# Patient Record
Sex: MALE | Race: WHITE | Employment: FULL TIME | URBAN - METROPOLITAN AREA
[De-identification: names, ages, dates, MRNs, and addresses within clinical notes are randomized per-mention and may not be internally consistent; named-entity substitution may affect disease eponyms.]

---

## 2019-01-02 ENCOUNTER — OFFICE VISIT (OUTPATIENT)
Dept: NEUROLOGY | Age: 44
End: 2019-01-02

## 2019-01-02 VITALS
SYSTOLIC BLOOD PRESSURE: 128 MMHG | BODY MASS INDEX: 35.99 KG/M2 | HEIGHT: 65 IN | WEIGHT: 216 LBS | OXYGEN SATURATION: 98 % | DIASTOLIC BLOOD PRESSURE: 78 MMHG | HEART RATE: 66 BPM | RESPIRATION RATE: 16 BRPM

## 2019-01-02 DIAGNOSIS — I77.74 VERTEBRAL ARTERY DISSECTION (HCC): Primary | ICD-10-CM

## 2019-01-02 DIAGNOSIS — I63.212 CEREBROVASCULAR ACCIDENT (CVA) DUE TO OCCLUSION OF LEFT VERTEBRAL ARTERY (HCC): ICD-10-CM

## 2019-01-02 DIAGNOSIS — Z00.00 PREVENTATIVE HEALTH CARE: ICD-10-CM

## 2019-01-02 PROBLEM — E66.01 SEVERE OBESITY (HCC): Status: ACTIVE | Noted: 2019-01-02

## 2019-01-02 RX ORDER — ASPIRIN 81 MG/1
81 TABLET ORAL DAILY
Qty: 128 TAB | Refills: 0 | Status: SHIPPED | COMMUNITY
Start: 2019-01-02

## 2019-01-02 NOTE — PATIENT INSTRUCTIONS
Start taking aspirin 81 mg daily Have the arteries in your head and neck evaluated with tests ordered I put in a referral to [facility] for you to establish primary care Follow-up after testing has been completed

## 2019-01-02 NOTE — PROGRESS NOTES
Chief Complaint Patient presents with  Numbness  
  bilateral hands  Dizziness Stroke in 2016  Gait Problem  
  poor balance Visit Vitals /78 Pulse 66 Resp 16 Ht 5' 5\" (1.651 m) Wt 98 kg (216 lb) SpO2 98% BMI 35.94 kg/m² 1. Have you been to the ER, urgent care clinic since your last visit? Hospitalized since your last visit? No 
 
2. Have you seen or consulted any other health care providers outside of the [address] [place] [address] since your last visit? Include any pap smears or colon screening.  No

## 2019-01-02 NOTE — PROGRESS NOTES
Isma We-07-A [address] [facility] Kevin Ma 56  
[address] [phone] Fax Referring: Self Chief Complaint Patient presents with  Numbness  
  bilateral hands  Dizziness Stroke in 2016  Gait Problem  
  poor balance 65-year-old right-handed gentleman who presents today for evaluation after having had a stroke. He does note  this is his first neurologic evaluation following stroke. 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 He had the abrupt onset of vertigo as well as intractable vomiting and weakness. He was taken into the hospital where he was given TPA for an acute stroke. 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 About a week prior he had chiropractic manipulation and it was felt this was the etiology. He was put on aspirin. he was sent to rehab for a week Since that time he has remained a bit dizzy and does not have the balance that he used to have. He sometimes has vertigo. No focal weakness. No loss or alteration in consciousness. He does have some numbness in his hand following some rotator cuff surgery. 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 He has not been taking his aspirin. He just moved up from [place] about a year or so ago. He has not established primary care. He has not been taking the aspirin because he noted that he would just forget. Cholesterol status at present unknown. He is not had any follow-up on the dissection in terms of imaging. He has not had any chest pain or shortness of breath. He does have some depression and anxiety says as well as fatigue. Has multifocal joint pain. Does not think his memory is as good as it used to be. Past Medical History:  
Diagnosis Date  Anxiety  Depression  Headache   
 Hearing loss  Memory loss  Musculoskeletal disorder  Neuropathy  Recent change in weight  Snoring  Stroke [facility]) 2016  Vertigo  Visual disturbance Past Surgical History:  
Procedure Laterality Date  HX ROTATOR CUFF REPAIR He presently takes no medication. No Known Allergies Social History Tobacco Use  Smoking status: Former Smoker Years: 25.00 Last attempt to quit: 2012 Years since quittin.0  Smokeless tobacco: Former User  Tobacco comment: 1 can/day Substance Use Topics  Alcohol use: No  
  Frequency: Never  Drug use: No  
 
 
Family History Problem Relation Age of Onset  Cancer Mother  Parkinson's Disease Father  Migraines Sister Review of Systems Pertinent positives and negatives as noted with remainder of comprehensive review negative Examination Visit Vitals /78 Pulse 66 Resp 16 Ht 5' 5\" (1.651 m) Wt 98 kg (216 lb) SpO2 98% BMI 35.94 kg/m² Pleasant gentleman. He is overweight. Probably dressed and groomed. He is interactive conversant and has a good attitude and affect. He has no icterus. No carotid bruits auscultated. His heart is regular. Pulses are symmetrical.  No edema of the lower extremities. Neurologically he is awake alert and oriented. Normal speech and language. Cognition normal.  He has reactive pupils. He gets through full versions. No nystagmus. Symmetric face and facial sensation. Tongue and palate are midline. Shoulder shrug symmetrical.  No pronation or drift. He resists fully in the upper and lower extremities in all muscle groups to direct testing. Reflexes globally depressed but symmetrical.  No pathologic reflexes elicited. No ataxia. Gait steady. Impression/Plan 54-year-old gentleman status post stroke with resultant baseline now of feelings of dizziness and imbalance but these of been stable since his discharge. He is not on aspirin right now certainly go out and have him start taking aspirin 81 mg. We need to reevaluate the vasculature and will do that with a carotid Doppler and a CTA. We will send for records from [place] and his wife knows which hospital it was, he is unsure. He notes it is [facility] is [facility]. He was given copious samples of aspirin today. He also needs a primary care physician and we discussed keeping cholesterol blood pressure etc. under control and needing preventative care. I referred him for such. He will follow-up after his testing. Suellen Be MD 
 
This note was created using voice recognition software. Despite editing, there may be syntax errors. This note will not be viewable in [address]

## 2019-01-02 NOTE — PROCEDURES
Carotid Doppler:     Date:  01/02/2019    Requesting Physician:  Robbin Ibarra MD     Indication:  Vertebral artery dissection and stroke. B-mode imaging reveals no significant plaque throughout the carotid systems bilaterally. Doppler spectral analysis reveals no significant velocity shifts. Vertebral artery flow antegrade bilaterally. Interpretation:   Normal study.

## 2019-01-25 ENCOUNTER — HOSPITAL ENCOUNTER (OUTPATIENT)
Dept: CT IMAGING | Age: 44
Discharge: HOME | End: 2019-01-25
Attending: PSYCHIATRY & NEUROLOGY
Payer: COMMERCIAL

## 2019-01-25 DIAGNOSIS — I77.74 VERTEBRAL ARTERY DISSECTION (HCC): ICD-10-CM

## 2019-01-25 DIAGNOSIS — I63.212 CEREBROVASCULAR ACCIDENT (CVA) DUE TO OCCLUSION OF LEFT VERTEBRAL ARTERY (HCC): ICD-10-CM

## 2019-01-25 PROCEDURE — 74011636320 HC RX REV CODE- 636/320: Performed by: RADIOLOGY

## 2019-01-25 PROCEDURE — 70498 CT ANGIOGRAPHY NECK: CPT

## 2019-01-25 RX ADMIN — IOPAMIDOL 100 ML: 755 INJECTION, SOLUTION INTRAVENOUS at 23:49

## 2019-06-03 ENCOUNTER — OFFICE VISIT (OUTPATIENT)
Dept: NEUROLOGY | Age: 44
End: 2019-06-03

## 2019-06-03 VITALS
HEART RATE: 78 BPM | HEIGHT: 65 IN | RESPIRATION RATE: 18 BRPM | SYSTOLIC BLOOD PRESSURE: 126 MMHG | WEIGHT: 218 LBS | OXYGEN SATURATION: 93 % | TEMPERATURE: 98.6 F | BODY MASS INDEX: 36.32 KG/M2 | DIASTOLIC BLOOD PRESSURE: 86 MMHG

## 2019-06-03 DIAGNOSIS — R26.89 IMBALANCE: Primary | ICD-10-CM

## 2019-06-03 DIAGNOSIS — I77.74 VERTEBRAL ARTERY DISSECTION (HCC): ICD-10-CM

## 2019-06-03 RX ORDER — MELOXICAM 7.5 MG/1
TABLET ORAL DAILY
COMMUNITY

## 2019-06-03 NOTE — PROGRESS NOTES
Chief Complaint   Patient presents with    Results     dop, cta    Cerebrovascular Accident     Coordination of Care Questions    1. Have you been to the ER, urgent care clinic outside of [facility] since your last visit? No       Hospitalized since your last visit? No    2. Have you seen or consulted any other health care providers outside of the [address] [place] [address] since your last visit? Include any pap smears or colon screening.  No

## 2019-06-03 NOTE — PROGRESS NOTES
[facility] and  [address] at [facility] at [address] [facility], [address] [facility] [address] [facility], [address] [place] [address]   [phone]              Chief Complaint   Patient presents with    Results     dop, cta    Cerebrovascular Accident     Current Outpatient Medications   Medication Sig Dispense Refill    meloxicam (MOBIC) 7.5 mg tablet Take  by mouth daily.  aspirin delayed-release 81 mg tablet Take 1 Tab by mouth daily. 128 Tab 0      No Known Allergies  Social History     Tobacco Use    Smoking status: Former Smoker     Years: 25.00     Last attempt to quit:      Years since quittin.4    Smokeless tobacco: Former User    Tobacco comment: 1 can/day   Substance Use Topics    Alcohol use: No     Frequency: Never    Drug use: No     Patient returns today for follow-up after his initial consultation for complaints of dizziness and imbalance etc.  He is status post stroke. He had the abrupt onset of vertigo when he was working in [place] and he had TPA for acute stroke. He was found to have dissection of the left vertebral artery. It was felt that chiropractic manipulation about a week prior was the etiology. In any regard he was started on aspirin last visit. We sent him for Doppler and CTA. We discussed modify modifiable risk factors including cholesterol etc.  He had a CTA which demonstrates normal left vertebral artery and there is no residual evidence of dissection. Carotids are normal.  He continues to have some dizziness which is described as imbalance. No vertigo. No loss of consciousness. No fall. Examination  Visit Vitals  /86 (BP 1 Location: Left arm, BP Patient Position: Sitting)   Pulse 78   Temp 98.6 °F (37 °C) (Oral)   Resp 18   Ht 5' 5\" (1.651 m)   Wt 98.9 kg (218 lb)   SpO2 93%   BMI 36.28 kg/m²   He is a pleasant gentleman.   Awake alert oriented and conversant. Normal speech and lines. Normal cognitive function. Full versions. No nystagmus. No pronation and no drift. No ataxia. He does have some difficulty with tandem gait    Impression/Plan  History of stroke with vertebral artery dissection in the vertebral artery is wide open and patent at this point. Discussed that his balance may be residual and as he is aging is getting a bit worse. We will send her to physical therapy for him to specifically learn a home exercise program.  Start taking his aspirin 81 mg regularly. I gave him some samples from the office today. Follow with primary care and I gave him those names again. Follow-up with me as needed at this point    Adonay Garrison MD      This note was created using voice recognition software. Despite editing, there may be syntax errors. This note will not be viewable in [address]

## 2020-03-04 ENCOUNTER — TELEPHONE (OUTPATIENT)
Dept: NEUROLOGY | Age: 45
End: 2020-03-04

## 2021-11-17 ENCOUNTER — TELEPHONE (OUTPATIENT)
Dept: NEUROLOGY | Age: 46
End: 2021-11-17

## 2021-11-17 NOTE — TELEPHONE ENCOUNTER
Returned call, advised that we would not be able to write a letter as he has not been seen since 2019.

## 2021-11-17 NOTE — TELEPHONE ENCOUNTER
----- Message from KRISHNA LANZA sent at 11/17/2021  9:29 AM EST -----  Regarding: Dr. Malia Martin telephone  Contact: [phone]  General Message/Vendor Calls    Caller's first and last name: Leo Alcala    Spouse      Reason for call: Reema Turner wants to see for Patient to see about getting medical exemption form for Covid vaccine due to history of bloods clot to have for job.       Callback required yes/no and why: yes, discuss       Best contact number(s): [phone] , [phone]      Details to clarify the request: n/a      KRISHNA LANZA

## 2022-03-19 PROBLEM — E66.01 SEVERE OBESITY (HCC): Status: ACTIVE | Noted: 2019-01-02

## 2023-05-17 RX ORDER — MELOXICAM 7.5 MG/1
TABLET ORAL DAILY
COMMUNITY

## 2023-05-17 RX ORDER — ASPIRIN 81 MG/1
81 TABLET ORAL DAILY
COMMUNITY
Start: 2019-01-02